# Patient Record
Sex: MALE | Race: BLACK OR AFRICAN AMERICAN | URBAN - METROPOLITAN AREA
[De-identification: names, ages, dates, MRNs, and addresses within clinical notes are randomized per-mention and may not be internally consistent; named-entity substitution may affect disease eponyms.]

---

## 2023-01-31 ENCOUNTER — OFFICE (OUTPATIENT)
Dept: URBAN - METROPOLITAN AREA CLINIC 11 | Facility: CLINIC | Age: 33
End: 2023-01-31

## 2023-01-31 VITALS
HEART RATE: 67 BPM | SYSTOLIC BLOOD PRESSURE: 128 MMHG | WEIGHT: 240 LBS | DIASTOLIC BLOOD PRESSURE: 81 MMHG | OXYGEN SATURATION: 97 % | HEIGHT: 72 IN

## 2023-01-31 DIAGNOSIS — R11.0 NAUSEA: ICD-10-CM

## 2023-01-31 DIAGNOSIS — K21.9 GASTRO-ESOPHAGEAL REFLUX DISEASE WITHOUT ESOPHAGITIS: ICD-10-CM

## 2023-01-31 PROCEDURE — 99203 OFFICE O/P NEW LOW 30 MIN: CPT | Performed by: NURSE PRACTITIONER

## 2023-01-31 RX ORDER — PANTOPRAZOLE SODIUM 40 MG/1
40 TABLET, DELAYED RELEASE ORAL
Qty: 30 | Refills: 11 | Status: ACTIVE
Start: 2023-01-31

## 2023-01-31 NOTE — SERVICENOTES
He has been having GERD symptoms for the past 6 months. Discussed  trigger foods and things to avoid.   Will start him on pantoprazole and if he continues to have nocturnal symptoms will add famotidine at night. will see him back in 2 months or sooner if needed.

## 2023-01-31 NOTE — SERVICEHPINOTES
Mr. Sarah is a 32 year old male here today with complaints of heartburn, reflux, and nausea. He states for the past 6 months he has had reflux when he lays down at night. He eats about 3 hours before going to bed. For the past month, he has been having heartburn throughout the day. He has been trying to change his diet and this has helped some. He has been using Tums and  [place] but continues to have symptoms. He occasionally has nausea as well. He has regular, formed bowel movements daily.  He denies any vomiting, abdominal pain, dysphagia, change in bowel habits, hematochezia, or melena.